# Patient Record
Sex: FEMALE | Race: WHITE
[De-identification: names, ages, dates, MRNs, and addresses within clinical notes are randomized per-mention and may not be internally consistent; named-entity substitution may affect disease eponyms.]

---

## 2017-04-03 ENCOUNTER — HOSPITAL ENCOUNTER (INPATIENT)
Age: 32
LOS: 2 days | Discharge: HOME | DRG: 638 | End: 2017-04-05
Payer: MEDICAID

## 2017-04-03 DIAGNOSIS — F17.210: ICD-10-CM

## 2017-04-03 DIAGNOSIS — E11.65: Primary | ICD-10-CM

## 2017-04-03 DIAGNOSIS — F15.20: ICD-10-CM

## 2017-04-03 DIAGNOSIS — E87.1: ICD-10-CM

## 2017-04-03 DIAGNOSIS — E86.0: ICD-10-CM

## 2017-04-03 DIAGNOSIS — E87.6: ICD-10-CM

## 2017-04-06 ENCOUNTER — HOSPITAL ENCOUNTER (OUTPATIENT)
Age: 32
Discharge: HOME | End: 2017-04-06
Payer: MEDICAID

## 2017-04-06 DIAGNOSIS — E11.65: Primary | ICD-10-CM

## 2018-05-01 ENCOUNTER — HOSPITAL ENCOUNTER (EMERGENCY)
Dept: HOSPITAL 76 - ED | Age: 33
Discharge: HOME | End: 2018-05-01
Payer: MEDICAID

## 2018-05-01 VITALS — SYSTOLIC BLOOD PRESSURE: 110 MMHG | DIASTOLIC BLOOD PRESSURE: 75 MMHG

## 2018-05-01 DIAGNOSIS — F17.200: ICD-10-CM

## 2018-05-01 DIAGNOSIS — Z79.4: ICD-10-CM

## 2018-05-01 DIAGNOSIS — M25.522: ICD-10-CM

## 2018-05-01 DIAGNOSIS — E11.65: Primary | ICD-10-CM

## 2018-05-01 PROCEDURE — 99283 EMERGENCY DEPT VISIT LOW MDM: CPT

## 2018-05-01 NOTE — ED PHYSICIAN DOCUMENTATION
History of Present Illness





- Stated complaint


Stated Complaint: LT ARM,LT JAW STIFFNESS





- Chief complaint


Chief Complaint: Ext Problem





- History obtained from


History obtained from: Patient





- History of Present Illness


Timing: How many weeks ago (2-3 weeks but worse past few days)


Pain level max: 0


Pain level now: 0


Improved by: no ameliorating factors


Worsened by: movement





- Additonal information


Additional information: 





c/o few weeks of left elbow and right knee swelling and stiffness without 

significant pain. she has run out of her lisinopril 5mg rx (she says this was 

prescribed for "kidney protection" and not high blood pressure), as well as her 

insulin (she takes both novolog and lantus; she still has some but has not been 

storing it properly and thus questionable efficacy). 





Review of Systems


Constitutional: denies: Fever, Chills, Sweats


GI: denies: Abdominal Pain, Nausea, Vomiting


: reports: Frequency


Endocrine: reports: Polydypsia, Polyuria





PD PAST MEDICAL HISTORY





- Past Medical History


Cardiovascular: None


Respiratory: None


Neuro: None


Endocrine/Autoimmune: None


GI: None


GYN: None


: None


HEENT: None


Psych: None


Musculoskeletal: None


Derm: None





- Past Surgical History


Past Surgical History: No





- Present Medications


Home Medications: 


 Ambulatory Orders











 Medication  Instructions  Recorded  Confirmed


 


Blood Sugar Diagnostic [Glucometer 1 each  ACHS #100 strip 04/05/17 





Strips]   


 


Blood-Glucose Meter [Glucometer] 1 each MC DAILY #1 each 04/05/17 


 


Calcium Carbonate [Tums (Calcium 500 mg PO DAILY #30 tablet 04/05/17 





Carbonate 500mg)]   


 


Insulin Aspart [NovoLOG] 5 unit SUBQ TIDWM #1 pen 04/05/17 


 


Insulin Glargine [Lantus Solostar] 12 unit SUBQ BID #1 pen 04/05/17 


 


Lancets 1 each  ACHS #100 each 04/05/17 


 


Nicotine 21 mg Patch [Nicoderm] 1 patch TOP DAILY #28 patch 04/05/17 


 


Insulin Aspart [NovoLOG] 5 unit SUBQ TIDWM #1 pen 05/01/18 


 


Insulin Glargine [Lantus Solostar] 12 unit SUBQ BID #1 pen 05/01/18 


 


Lisinopril 5 mg PO DAILY #30 tablet 05/01/18 














- Allergies


Allergies/Adverse Reactions: 


 Allergies











Allergy/AdvReac Type Severity Reaction Status Date / Time


 


No Known Drug Allergies Allergy   Verified 05/01/18 04:48














- Social History


Does the pt smoke?: Yes


Smoking Status: Current every day smoker


Does the pt drink ETOH?: Yes


Does the pt have substance abuse?: Yes





- Immunizations


Immunizations are current?: Yes





- POLST


Patient has POLST: No





PD ED PE NORMAL





- Vitals


Vital signs reviewed: Yes





- General


General: Alert and oriented X 3, No acute distress, Well developed/nourished





- HEENT


HEENT: Moist mucous membranes





- Derm


Derm: Normal color, Warm and dry, No rash





- Extremities


Extremities: No tenderness to palpate, Normal ROM s pain, Other (subtle 

swelling left elbow posterolateral aspect that is nontender and without erythema

, crepitus, or hot to touch)





- Neuro


Neuro: Alert and oriented X 3, No motor deficit, No sensory deficit





Results





- Vitals


Vitals: 


 Oxygen











O2 Source                      Room air

















- Labs


Labs: 


 Laboratory Tests











  05/01/18





  04:52


 


POC Whole Bld Glucose  310 H














PD MEDICAL DECISION MAKING





- ED course


Complexity details: considered differential, d/w patient


ED course: 





provided new prescriptions for her lantus, novolog, and lisinopril. FSBS is 

310. She had taken a dose of insulin shortly before arriving. She expressed 

concern that her joint swelling and stiffness is due to high blood sugar and I 

reassured her that this is not a symptom c/w hyperglycemia. She asked about 

checking for urine ketones, which is reasonable and thus was ordered. However, 

she subsequently decided against it and thus order was cancelled. Her HPI and 

exam do not suggest DKA nor need for emergent testing or treatment. I 

emphasized the need for her to fill the prescriptions as soon as the pharmacy 

is open and start taking as prescribed. 





Departure





- Departure


Disposition: 01 Home, Self Care


Clinical Impression: 


 Hyperglycemia





Joint pain


Qualifiers:


 Joint pain location: elbow Laterality: left Qualified Code(s): M25.522 - Pain 

in left elbow





Condition: Good


Instructions:  ED Joint Pain, ED Hyperglycemia Diabetic


Follow-Up: 


[facility] [Provider Group]


[facility] [Provider Group]


Prescriptions: 


Insulin Aspart [NovoLOG] 5 unit SUBQ TIDWM #1 pen


Insulin Glargine [Lantus Solostar] 12 unit SUBQ BID #1 pen


Lisinopril 5 mg PO DAILY #30 tablet


Discharge Date/Time: 05/01/18 05:37

## 2018-11-30 ENCOUNTER — HOSPITAL ENCOUNTER (OUTPATIENT)
Dept: HOSPITAL 76 - LAB.N | Age: 33
Discharge: HOME | End: 2018-11-30
Attending: PHYSICIAN ASSISTANT
Payer: MEDICAID

## 2018-11-30 DIAGNOSIS — Z79.4: Primary | ICD-10-CM

## 2018-11-30 DIAGNOSIS — R73.9: ICD-10-CM

## 2018-11-30 LAB
ANION GAP SERPL CALCULATED.4IONS-SCNC: 7 MMOL/L (ref 6–13)
BASOPHILS NFR BLD AUTO: 0 10^3/UL (ref 0–0.1)
BASOPHILS NFR BLD AUTO: 0.3 %
BUN SERPL-MCNC: 13 MG/DL (ref 6–20)
CALCIUM UR-MCNC: 9.1 MG/DL (ref 8.5–10.3)
CHLORIDE SERPL-SCNC: 98 MMOL/L (ref 101–111)
CO2 SERPL-SCNC: 29 MMOL/L (ref 21–32)
CREAT SERPLBLD-SCNC: 0.6 MG/DL (ref 0.4–1)
EOSINOPHIL # BLD AUTO: 0.2 10^3/UL (ref 0–0.7)
EOSINOPHIL NFR BLD AUTO: 2.6 %
ERYTHROCYTE [DISTWIDTH] IN BLOOD BY AUTOMATED COUNT: 13.8 % (ref 12–15)
EST. AVERAGE GLUCOSE BLD GHB EST-MCNC: 283 MG/DL (ref 70–100)
GFRSERPLBLD MDRD-ARVRAT: 116 ML/MIN/{1.73_M2} (ref 89–?)
GLUCOSE SERPL-MCNC: 280 MG/DL (ref 70–100)
HB2 TOTAL: 16.6 G/DL
HBA1C BLD-MCNC: 1.69 G/DL
HEMOGLOBIN A1C %: 11.5 % (ref 4.6–6.2)
HGB UR QL STRIP: 15.5 G/DL (ref 12–16)
LYMPHOCYTES # SPEC AUTO: 3.1 10^3/UL (ref 1.5–3.5)
LYMPHOCYTES NFR BLD AUTO: 41.4 %
MCH RBC QN AUTO: 30.2 PG (ref 27–31)
MCHC RBC AUTO-ENTMCNC: 33.2 G/DL (ref 32–36)
MCV RBC AUTO: 90.9 FL (ref 81–99)
MONOCYTES # BLD AUTO: 0.6 10^3/UL (ref 0–1)
MONOCYTES NFR BLD AUTO: 8.3 %
NEUTROPHILS # BLD AUTO: 3.6 10^3/UL (ref 1.5–6.6)
NEUTROPHILS # SNV AUTO: 7.6 X10^3/UL (ref 4.8–10.8)
NEUTROPHILS NFR BLD AUTO: 47.4 %
PDW BLD AUTO: 8.5 FL (ref 7.9–10.8)
PLATELET # BLD: 306 10^3/UL (ref 130–450)
RBC MAR: 5.14 10^6/UL (ref 4.2–5.4)
SODIUM SERPLBLD-SCNC: 134 MMOL/L (ref 135–145)

## 2018-11-30 PROCEDURE — 81599 UNLISTED MAAA: CPT

## 2018-11-30 PROCEDURE — 80048 BASIC METABOLIC PNL TOTAL CA: CPT

## 2018-11-30 PROCEDURE — 83036 HEMOGLOBIN GLYCOSYLATED A1C: CPT

## 2018-11-30 PROCEDURE — 84681 ASSAY OF C-PEPTIDE: CPT

## 2018-11-30 PROCEDURE — 85025 COMPLETE CBC W/AUTO DIFF WBC: CPT

## 2018-11-30 PROCEDURE — 36415 COLL VENOUS BLD VENIPUNCTURE: CPT

## 2019-02-18 ENCOUNTER — HOSPITAL ENCOUNTER (OUTPATIENT)
Dept: HOSPITAL 76 - EMS | Age: 34
End: 2019-02-18
Attending: SURGERY
Payer: MEDICAID

## 2019-02-18 DIAGNOSIS — Z03.89: Primary | ICD-10-CM

## 2019-07-10 ENCOUNTER — HOSPITAL ENCOUNTER (EMERGENCY)
Dept: HOSPITAL 76 - ED | Age: 34
Discharge: HOME | End: 2019-07-10
Payer: MEDICAID

## 2019-07-10 VITALS — SYSTOLIC BLOOD PRESSURE: 110 MMHG | DIASTOLIC BLOOD PRESSURE: 74 MMHG

## 2019-07-10 DIAGNOSIS — Z91.128: ICD-10-CM

## 2019-07-10 DIAGNOSIS — T38.3X6A: ICD-10-CM

## 2019-07-10 DIAGNOSIS — Z76.0: ICD-10-CM

## 2019-07-10 DIAGNOSIS — F17.200: ICD-10-CM

## 2019-07-10 DIAGNOSIS — E10.65: Primary | ICD-10-CM

## 2019-07-10 DIAGNOSIS — Z79.4: ICD-10-CM

## 2019-07-10 LAB
ANION GAP SERPL CALCULATED.4IONS-SCNC: 12 MMOL/L (ref 6–13)
BASE EXCESS BLDV CALC-SCNC: -3.1 MMOL/L
BUN SERPL-MCNC: 14 MG/DL (ref 6–20)
CALCIUM UR-MCNC: 9.5 MG/DL (ref 8.5–10.3)
CHLORIDE SERPL-SCNC: 95 MMOL/L (ref 101–111)
CLARITY UR REFRACT.AUTO: CLEAR
CO2 BLDV-SCNC: 24.7 MMOL/L (ref 24–29)
CO2 SERPL-SCNC: 27 MMOL/L (ref 21–32)
CREAT SERPLBLD-SCNC: 0.7 MG/DL (ref 0.4–1)
GFRSERPLBLD MDRD-ARVRAT: 96 ML/MIN/{1.73_M2} (ref 89–?)
GLUCOSE SERPL-MCNC: 498 MG/DL (ref 70–100)
GLUCOSE UR QL STRIP.AUTO: >=1000 MG/DL
KETONES UR QL STRIP.AUTO: 15 MG/DL
NITRITE UR QL STRIP.AUTO: NEGATIVE
PCO2 BLDV: 46.4 MMHG (ref 41–51)
PH BLDV: 7.32 [PH] (ref 7.31–7.41)
PH UR STRIP.AUTO: 5.5 PH (ref 5–7.5)
PO2 BLDV: 30.3 MMHG (ref 25–47)
PROT UR STRIP.AUTO-MCNC: NEGATIVE MG/DL
RBC # UR STRIP.AUTO: NEGATIVE /UL
SODIUM SERPLBLD-SCNC: 134 MMOL/L (ref 135–145)
SP GR UR STRIP.AUTO: <=1.005 (ref 1–1.03)
UROBILINOGEN UR QL STRIP.AUTO: (no result) E.U./DL
UROBILINOGEN UR STRIP.AUTO-MCNC: NEGATIVE MG/DL

## 2019-07-10 PROCEDURE — 80048 BASIC METABOLIC PNL TOTAL CA: CPT

## 2019-07-10 PROCEDURE — 96360 HYDRATION IV INFUSION INIT: CPT

## 2019-07-10 PROCEDURE — 36415 COLL VENOUS BLD VENIPUNCTURE: CPT

## 2019-07-10 PROCEDURE — 81003 URINALYSIS AUTO W/O SCOPE: CPT

## 2019-07-10 PROCEDURE — 82803 BLOOD GASES ANY COMBINATION: CPT

## 2019-07-10 PROCEDURE — 99283 EMERGENCY DEPT VISIT LOW MDM: CPT

## 2019-07-10 NOTE — ED PHYSICIAN DOCUMENTATION
History of Present Illness





- Stated complaint


Stated Complaint: MED REFILL





- Chief complaint


Chief Complaint: General





- History obtained from


History obtained from: Patient





- History of Present Illness


Timing: How many days ago (2)


Severity Comments: moderate hyperglyemia


Quality: no pain


Radiates to: no radiation


Improved by: nothing


Worsened by: not having her insulin


Associated symptoms: polyuria, polydypsia, feelings of a dry  mouth and 

dehydration





- Treatment prior to arrival


Treatment prior to arrival: 





none





- Additonal information


Additional information: 





35 y/o F with Type 1 diabetes on Lantus and novolog, reports she lost her 

medications 4 days ago and has not had her insulin thus having high blood 

sugars. 





Review of Systems


Ten Systems: 10 systems reviewed and negative


Constitutional: denies: Fever, Chills


Cardiac: denies: Chest pain / pressure


Respiratory: denies: Dyspnea


GI: denies: Abdominal Pain, Nausea, Vomiting, Diarrhea


Skin: denies: Rash


Neurologic: denies: Generalized weakness


Endocrine: reports: Polydypsia, Polyuria





PD PAST MEDICAL HISTORY





- Past Medical History


Past Medical History: Yes


Cardiovascular: None


Respiratory: None


Endocrine/Autoimmune: None


GI: None


GYN: None


: None


HEENT: None


Psych: None


Musculoskeletal: None


Derm: None





- Past Surgical History


Past Surgical History: No





- Present Medications


Home Medications: 


                                Ambulatory Orders











 Medication  Instructions  Recorded  Confirmed


 


Blood Sugar Diagnostic [Glucometer 1 each  ACHS #100 strip 04/05/17 





Strips]   


 


Blood-Glucose Meter [Glucometer] 1 each MC DAILY #1 each 04/05/17 


 


RX: Calcium Carbonate [Tums 500 mg PO DAILY #30 tablet 04/05/17 





(Calcium Carbonate 500mg)]   


 


RX: Insulin Aspart [NovoLOG] 5 unit SUBQ TIDWM #1 pen 04/05/17 


 


RX: Insulin Glargine [Lantus 12 unit SUBQ BID #1 pen 04/05/17 





Solostar]   


 


RX: Lancets 1 each  ACHS #100 each 04/05/17 


 


RX: Nicotine 21 mg Patch [Nicoderm] 1 patch TOP DAILY #28 patch 04/05/17 


 


Insulin Aspart [NovoLOG] 5 unit SUBQ TIDWM #1 pen 05/01/18 


 


Insulin Glargine [Lantus Solostar] 12 unit SUBQ BID #1 pen 05/01/18 


 


RX: Lisinopril 5 mg PO DAILY #30 tablet 05/01/18 


 


Insulin Aspart [NovoLOG] 5 unit SUBQ TIDWM #1 pen 07/10/19 


 


Insulin Glargine [Lantus Solostar] 25 unit SQ QPM #1 pen 07/10/19 














- Allergies


Allergies/Adverse Reactions: 


                                    Allergies











Allergy/AdvReac Type Severity Reaction Status Date / Time


 


No Known Drug Allergies Allergy   Verified 05/01/18 04:48














- Social History


Does the pt smoke?: Yes


Smoking Status: Current every day smoker


Does the pt drink ETOH?: Yes


Does the pt have substance abuse?: Yes





- Immunizations


Immunizations are current?: Yes





- POLST


Patient has POLST: No





PD ED PE NORMAL





- Vitals


Vital signs reviewed: Yes





- General


General: Alert and oriented X 3, No acute distress, Well developed/nourished





- HEENT


HEENT: Atraumatic





- Neck


Neck: Supple, no meningeal sign, No JVD





- Cardiac


Cardiac: RRR, No murmur, No gallop, No rub





- Respiratory


Respiratory: No respiratory distress, Clear bilaterally





- Abdomen


Abdomen: Soft, Non tender, Non distended





- Female 


Female : Deferred





- Rectal


Rectal: Deferred





- Derm


Derm: Normal color, Warm and dry, No rash





- Extremities


Extremities: No deformity, No edema





- Neuro


Neuro: Alert and oriented X 3


Eye Opening: Spontaneous


Motor: Obeys Commands


Verbal: Oriented


GCS Score: 15





- Psych


Psych: Normal mood, Normal affect





Results





- Vitals


Vitals: 


                               Vital Signs - 24 hr











  07/10/19 07/10/19





  12:01 15:09


 


Temperature 36.3 C L 


 


Heart Rate 86 67


 


Respiratory 18 19





Rate  


 


Blood Pressure 115/77 110/74


 


O2 Saturation 99 98








                                     Oxygen











O2 Source                      Room air

















- Labs


Labs: 


                                Laboratory Tests











  07/10/19 07/10/19 07/10/19





  12:05 12:19 12:34


 


VBG pH   


 


VBG pCO2   


 


VBG pO2   


 


VBG HCO3   


 


VBG Total CO2   


 


VBG O2 Saturation   


 


VBG Base Excess   


 


Sodium    134 L


 


Potassium    4.1


 


Chloride    95 L


 


Carbon Dioxide    27


 


Anion Gap    12.0


 


BUN    14


 


Creatinine    0.7


 


Estimated GFR (MDRD)    96


 


Glucose    498 H


 


POC Whole Bld Glucose  475 H  


 


Calcium    9.5


 


Urine Color   LT. YELLOW 


 


Urine Clarity   CLEAR 


 


Urine pH   5.5 


 


Ur Specific Gravity   <=1.005 


 


Urine Protein   NEGATIVE 


 


Urine Glucose (UA)   >=1000 H 


 


Urine Ketones   15 H 


 


Urine Occult Blood   NEGATIVE 


 


Urine Nitrite   NEGATIVE 


 


Urine Bilirubin   NEGATIVE 


 


Urine Urobilinogen   0.2 (NORMAL) 


 


Ur Leukocyte Esterase   NEGATIVE 














  07/10/19 07/10/19 07/10/19





  12:34 13:28 15:08


 


VBG pH  7.318  


 


VBG pCO2  46.4  


 


VBG pO2  30.3  


 


VBG HCO3  23.3  


 


VBG Total CO2  24.7  


 


VBG O2 Saturation  57.2 L  


 


VBG Base Excess  -3.1 L  


 


Sodium   


 


Potassium   


 


Chloride   


 


Carbon Dioxide   


 


Anion Gap   


 


BUN   


 


Creatinine   


 


Estimated GFR (MDRD)   


 


Glucose   


 


POC Whole Bld Glucose   378 H  309 H


 


Calcium   


 


Urine Color   


 


Urine Clarity   


 


Urine pH   


 


Ur Specific Gravity   


 


Urine Protein   


 


Urine Glucose (UA)   


 


Urine Ketones   


 


Urine Occult Blood   


 


Urine Nitrite   


 


Urine Bilirubin   


 


Urine Urobilinogen   


 


Ur Leukocyte Esterase   








no anion gap or acidosis present. 





PD MEDICAL DECISION MAKING





- ED course


Complexity details: reviewed results, re-evaluated patient, considered 

differential, d/w patient


ED course: 





ddx - DKA, HHNK, hyperglycemia with diabetes and dehydration, ketonuria








35 y/o F with hx of DM type 1 missed several doses of insulin due to loss of 

meds. 


Pt not in DKA or HHNK. 


She is overall well appearing she is not acidotic and has no anion gap. 


Her blood sugars improved with fluids and insulin here.


She states she can obtain syringes and supplies but needs a med refill.


Refilled pt's insulin rx here and pt is stable for outpt f/u





Departure





- Departure


Disposition: 01 Home, Self Care


Clinical Impression: 


 Hyperglycemia due to type 1 diabetes mellitus





Condition: Stable


Record reviewed to determine appropriate education?: Yes


Instructions:  Diabetes Sick Day Plan


Follow-Up: 


Provider,Other [Primary Care Provider] - 


Prescriptions: 


Insulin Aspart [NovoLOG] 5 unit SUBQ TIDWM #1 pen


Insulin Glargine [Lantus Solostar] 25 unit SQ QPM #1 pen


Discharge Date/Time: 07/10/19 15:27

## 2019-09-08 ENCOUNTER — HOSPITAL ENCOUNTER (EMERGENCY)
Dept: HOSPITAL 76 - ED | Age: 34
Discharge: HOME | End: 2019-09-08
Payer: MEDICAID

## 2019-09-08 VITALS — SYSTOLIC BLOOD PRESSURE: 116 MMHG | DIASTOLIC BLOOD PRESSURE: 77 MMHG

## 2019-09-08 DIAGNOSIS — F17.200: ICD-10-CM

## 2019-09-08 DIAGNOSIS — L03.012: Primary | ICD-10-CM

## 2019-09-08 PROCEDURE — 99283 EMERGENCY DEPT VISIT LOW MDM: CPT

## 2019-09-08 PROCEDURE — 99282 EMERGENCY DEPT VISIT SF MDM: CPT

## 2019-09-08 NOTE — ED PHYSICIAN DOCUMENTATION
History of Present Illness





- Stated complaint


Stated Complaint: FINGER SWOLLEN





- Chief complaint


Chief Complaint: General





- History obtained from


History obtained from: Patient, Family





- History of Present Illness


Timing: How many days ago (3)


Pain level max: 4


Pain level now: 3





- Additonal information


Additional information: 





Left third digit pain, just to the right side of the fingernail.  Worse with 

palpation.  Better with rest.  Mild swelling.  No drainage.





Review of Systems


Constitutional: denies: Fever


: denies: Now pregnant EGA





PD PAST MEDICAL HISTORY





- Past Medical History


Cardiovascular: None


Respiratory: None


Endocrine/Autoimmune: None


GI: None


GYN: None


: None


HEENT: None


Psych: None


Musculoskeletal: None


Derm: None





- Past Surgical History


Past Surgical History: No





- Present Medications


Home Medications: 


                                Ambulatory Orders











 Medication  Instructions  Recorded  Confirmed


 


Blood Sugar Diagnostic [Glucometer 1 each  ACHS #100 strip 04/05/17 





Strips]   


 


Blood-Glucose Meter [Glucometer] 1 each MC DAILY #1 each 04/05/17 


 


Calcium Carbonate [Tums (Calcium 500 mg PO DAILY #30 tablet 04/05/17 





Carbonate 500mg)]   


 


Insulin Aspart [NovoLOG] 5 unit SUBQ TIDWM #1 pen 04/05/17 


 


Insulin Glargine [Lantus Solostar] 12 unit SUBQ BID #1 pen 04/05/17 


 


Lancets 1 each  ACHS #100 each 04/05/17 


 


Nicotine 21 mg Patch [Nicoderm] 1 patch TOP DAILY #28 patch 04/05/17 


 


Insulin Aspart [NovoLOG] 5 unit SUBQ TIDWM #1 pen 05/01/18 


 


Insulin Glargine [Lantus Solostar] 12 unit SUBQ BID #1 pen 05/01/18 


 


Lisinopril 5 mg PO DAILY #30 tablet 05/01/18 


 


Insulin Aspart [NovoLOG] 5 unit SUBQ TIDWM #1 pen 07/10/19 


 


Insulin Glargine [Lantus Solostar] 25 unit SQ QPM #1 pen 07/10/19 


 


Bacitracin Zinc Oint 1 applic TOP BID #1 tube 09/08/19 














- Allergies


Allergies/Adverse Reactions: 


                                    Allergies











Allergy/AdvReac Type Severity Reaction Status Date / Time


 


No Known Drug Allergies Allergy   Verified 09/08/19 09:08














- Social History


Does the pt smoke?: Yes


Smoking Status: Current every day smoker


Does the pt drink ETOH?: Yes


Does the pt have substance abuse?: Yes





- Immunizations


Immunizations are current?: Yes





- POLST


Patient has POLST: No





PD ED PE NORMAL





- Vitals


Vital signs reviewed: Yes





- General


General: Alert and oriented X 3, No acute distress





- Derm


Derm: Warm and dry





- Extremities


Extremities: Other (Tender to palpation to the medial aspect of the left third 

digit fingernail.  Minimal swelling.  No drainage.)





- Neuro


Neuro: Alert and oriented X 3





Results





- Vitals


Vitals: 





                               Vital Signs - 24 hr











  09/08/19





  09:06


 


Temperature 36.2 C L


 


Heart Rate 92


 


Respiratory 16





Rate 


 


Blood Pressure 116/77


 


O2 Saturation 99








                                     Oxygen











O2 Source                      Room air

















PD MEDICAL DECISION MAKING





- ED course


Complexity details: considered differential, d/w patient


ED course: 





Patient with what appears to be an early paronychia versus an early ingrowing 

nail.  Will trial on topical antibiotics and warm soaks to see if this improves 

her symptoms.  If not, we will plan on drainage. There is not appear to be any 

purulent material to drain at this time.





Departure





- Departure


Disposition: 01 Home, Self Care


Clinical Impression: 


 Paronychia





Condition: Good


Instructions:  ED Fingernail Infec


Follow-Up: 


your,doctor in 3 days for wound check [Other]


Prescriptions: 


Bacitracin Zinc Oint 1 applic TOP BID #1 tube


Comments: 


Apply the bacitracin as instructed.  Soak the area 2-3 times a day in warm water

 for approximately 5 minutes at a time.  Return if you worsen.  This should 

improve over the next 2-3 days. Follow-up with your doctor in 3 days for a wound

 check.